# Patient Record
Sex: MALE | Race: WHITE
[De-identification: names, ages, dates, MRNs, and addresses within clinical notes are randomized per-mention and may not be internally consistent; named-entity substitution may affect disease eponyms.]

---

## 2017-02-05 ENCOUNTER — HOSPITAL ENCOUNTER (INPATIENT)
Dept: HOSPITAL 80 - FED | Age: 77
LOS: 4 days | Discharge: HOME HEALTH | DRG: 470 | End: 2017-02-09
Attending: INTERNAL MEDICINE | Admitting: INTERNAL MEDICINE
Payer: COMMERCIAL

## 2017-02-05 DIAGNOSIS — S72.011A: Primary | ICD-10-CM

## 2017-02-05 DIAGNOSIS — R26.9: ICD-10-CM

## 2017-02-05 DIAGNOSIS — D64.9: ICD-10-CM

## 2017-02-05 DIAGNOSIS — Z99.81: ICD-10-CM

## 2017-02-05 DIAGNOSIS — Z72.89: ICD-10-CM
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
## 2017-02-05 DIAGNOSIS — J44.9: ICD-10-CM
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
## 2017-02-05 DIAGNOSIS — M10.9: ICD-10-CM

## 2017-02-05 DIAGNOSIS — J96.10: ICD-10-CM

## 2017-02-05 DIAGNOSIS — N40.0: ICD-10-CM

## 2017-02-05 LAB
INR PPP: 2.49 (ref 0.83–1.16)
PROTHROMBIN TIME: 27.2 SEC (ref 12–15)

## 2017-02-05 NOTE — CPEKG
Heart Rate: 53

RR Interval: 1132

P-R Interval: 208

QRSD Interval: 104

QT Interval: 456

QTC Interval: 429

P Axis: 74

QRS Axis: 30

T Wave Axis: 50

EKG Severity - NORMAL ECG -

EKG Impression: SINUS RHYTHM

Electronically Signed By: Mauri Brice 06-Feb-2017 10:53:20

## 2017-02-05 NOTE — EDPHY
H & P


Stated Complaint: right leg gave out on pt. R hip pain with movement


HPI/ROS: 


HPI





CHIEF COMPLAINT:  Right hip pain status post mechanical trip and fall





HISTORY OF PRESENT ILLNESS:  This patient very pleasant 76-year-old male, he 

presents emergency room by EMS [facility] 15, alert and orient x4 after he sustained a 

mechanical trip and fall landing on his right hip.  He now has a shortened 

externally rotated right leg.  He is neurovascularly intact. He tells me that 

he fell asleep after the football game this evening he had multiple alcoholic 

beverages tonight.  His wife went to wake him up in get him up stood up out of 

his chair fell over no head strike denies neck pain chest pain or shortness of 

breath. denies being on blood thinners.  Denies any other areas of pain except 

right lateral hip pain.





Past Medical History:  Gout





Past Surgical History:  Multiple orthopedic surgeries including arms and fingers





Social History:  occasional alcohol use, denies drugs or tobacco products





Family History:


 noncontributory





ROS   


REVIEW OF SYSTEMS:


A comprehensive 10 point review of systems is otherwise negative aside from 

elements mentioned in the history of present illness.








Exam   


Constitutional   triage nursing summary reviewed, vital signs reviewed, awake/

alert. 


Eyes   normal conjunctivae and sclera, EOMI, PERRLA. 


HENT   normal inspection, atraumatic, moist mucus membranes, no epistaxis, neck 

supple/ no meningismus, no raccoon eyes. 


Respiratory   clear to auscultation bilaterally, normal breath sounds, no 

respiratory distress, no wheezing. 


Cardiovascular   rate normal, regular rhythm, no murmur, no edema, distal 

pulses normal. 


Gastrointestinal   soft, non-tender, no rebound, no guarding, normal bowel 

sounds, no distension, no pulsatile mass. 


Genitourinary   no CVA tenderness. 


Musculoskeletal  right hip:  Tender palpation over the lateral hip, right lower 

extremity is externally rotated and shortened.  Good sensation.  

Neurovascularly intact. Good distal pulses. Does have pain with range of 

motion. no midline vertebral tenderness, full range of motion, no calf swelling

, no tenderness of extremities, no meningismus, good pulses, neurovascularly 

intact.


Skin   pink, warm, & dry, no rash, skin atraumatic. 


Neurologic   awake, alert and oriented x 3, AAOx3, moves all 4 extremities 

equally, motor intact, sensory intact, CN II-XII intact, normal cerebellar, 

normal vision, normal speech. 


Psychiatric   normal mood/affect. 


Heme/Lymph/Immune   no lymphadenopathy.





Differential Diagnosis:  Includes but is not limited to in a particular order, 

right hip fracture, femur fracture, soft tissue injury, mechanical trip and fall





Medical Decision Making: This patient had a hip x-ray, EKG, blood work, an IV 

will be established be placed on a monitor.





Re-evaluation:








ED x-ray right hip:  This shows a right femoral neck hip fracture.





EKG interpretation by me on record in [facility] system.  Impression 





1230:  I did consult Orthopedics and spoke with Dr. Castrejon.  He will consult on 

this patient in the morning.





1230:  Is no this patient's INR is 2.4.  It is noted the hemoglobin is low at 

7. Will repeat the blood work as he is not on any anticoagulation.  And there 

may be a lab error.


  I will admit the patient to the hospitalist service for a right femoral neck 

fracture.








EKG interpretation by me on record in [facility] system.  Impression  time of 

EKG is 2304 this is sinus rhythm rate of 53 otherwise unremarkable no acute 

ischemic changes nose specifically no ST elevation, ST depression, T-wave 

abnormalities prolonged intervals.





1236:  Blood work is being repeated at this time I spoke with the hospitalist 

service Dr. Quigley agrees to admit this patient.  Orthopedics Dr. Castrejon is 

consult and aware.


Source: Patient





- Personal History


Current Tetanus/Diphtheria Vaccine: Unsure


Current Tetanus Diphtheria and Acellular Pertussis (TDAP): Unsure


Tetanus Vaccine Date: <10 years





- Medical/Surgical History


Hx Asthma: No


Hx Chronic Respiratory Disease: Yes


Hx Diabetes: No


Hx Cardiac Disease: No


Hx Renal Disease: No


Hx Cirrhosis: No


Hx Alcoholism: No


Hx HIV/AIDS: No


Hx Splenectomy or Spleen Trauma: No


Other PMH: gout, COPD





- Social History


Smoking Status: Former smoker


Constitutional: 


 Initial Vital Signs











Temperature (C)  37.1 C   02/05/17 22:40


 


Heart Rate  61   02/05/17 22:40


 


Respiratory Rate  18   02/05/17 22:40


 


Blood Pressure  124/54 H  02/05/17 22:40


 


O2 Sat (%)  92   02/05/17 22:40








 











O2 Delivery Mode               Nasal Cannula


 


O2 (L/minute)                  4














Allergies/Adverse Reactions: 


 





levofloxacin [From Levaquin] Allergy (Verified 02/05/17 22:45)


 


Sulfa (Sulfonamide Antibiotics) Allergy (Verified 08/18/11 17:02)


 THROAT CLOSING








Home Medications: 














 Medication  Instructions  Recorded


 


Allopurinol  02/05/17














Medical Decision Making





- Data Points


Laboratory Results: 


 











  02/06/17 02/05/17





  00:25 23:35


 


WBC  Pending   6.64 10^3/uL





    (3.80-9.50) 


 


RBC  Pending   2.09 L 10^6/uL





    (4.40-6.38) 


 


Hgb  Pending   7.0 L g/dL





    (13.7-17.5) 


 


Hct  Pending   21.2 L %





    (40.0-51.0) 


 


MCV  Pending   101.4 H fL





    (81.5-99.8) 


 


MCH  Pending   33.5 pg





    (27.9-34.1) 


 


MCHC  Pending   33.0 g/dL





    (32.4-36.7) 


 


RDW  Pending   12.7 %





    (11.5-15.2) 


 


Plt Count  Pending   94 L 10^3/uL





    (150-400) 


 


MPV  Pending   9.2 fL





    (8.7-11.7) 


 


Neut % (Auto)  Pending   75.6 H %





    (39.3-74.2) 


 


Lymph % (Auto)  Pending   15.4 %





    (15.0-45.0) 


 


Mono % (Auto)  Pending   7.1 %





    (4.5-13.0) 


 


Eos % (Auto)  Pending   1.2 %





    (0.6-7.6) 


 


Baso % (Auto)  Pending   0.2 L %





    (0.3-1.7) 


 


Nucleat RBC Rel Count  Pending   0.0 %





    (0.0-0.2) 


 


Absolute Neuts (auto)  Pending   5.03 10^3/uL





    (1.70-6.50) 


 


Absolute Lymphs (auto)  Pending   1.02 10^3/uL





    (1.00-3.00) 


 


Absolute Monos (auto)  Pending   0.47 10^3/uL





    (0.30-0.80) 


 


Absolute Eos (auto)  Pending   0.08 10^3/uL





    (0.03-0.40) 


 


Absolute Basos (auto)  Pending   0.01 L 10^3/uL





    (0.02-0.10) 


 


Absolute Nucleated RBC  Pending   0.00 10^3/uL





    (0-0.01) 


 


Immature Gran %  Pending   0.5 %





    (0.0-1.1) 


 


Immature Gran #  Pending   0.03 10^3/uL





    (0.00-0.10) 


 


PT  Pending   27.2 H SEC





    (12.0-15.0) 


 


INR  Pending   2.49 H 





    (0.83-1.16) 


 


APTT  Pending   45.9 H SEC





    (23.0-38.0) 


 


Sodium  Pending   Pending 





   


 


Potassium  Pending   Pending 





   


 


Chloride  Pending   Pending 





   


 


Carbon Dioxide  Pending   Pending 





   


 


Anion Gap  Pending   Pending 





   


 


BUN  Pending   Pending 





   


 


Creatinine  Pending   Pending 





   


 


Estimated GFR  Pending   Pending 





   


 


Glucose  Pending   Pending 





   


 


Calcium  Pending   Pending 





   











Medications Given: 


 








Discontinued Medications





Fentanyl (Sublimaze)  50 mcg IVP EDNOW ONE


   Stop: 02/05/17 22:53


   Last Admin: 02/05/17 23:16 Dose:  50 mcg


Sodium Chloride (Ns)  1,000 mls @ 0 mls/hr IV ONCE ONE


   PRN Reason: Wide Open


   Stop: 02/05/17 22:53


   Last Admin: 02/05/17 23:16 Dose:  1,000 mls


Ondansetron HCl (Zofran)  4 mg IVP EDNOW ONE


   Stop: 02/05/17 22:53


   Last Admin: 02/05/17 23:15 Dose:  4 mg








Departure





- Departure


Disposition: [facility] ([place], [place]) Inpatient Acute


Clinical Impression: 


 Fracture of femoral neck, right


Condition: Fair


Referrals: 


Jeramy Owens MD [Primary Care Provider] - As per Instructions

## 2017-02-06 LAB
% IMMATURE GRANULYOCYTES: (no result) % (ref 0–1.1)
% IMMATURE GRANULYOCYTES: 0.4 % (ref 0–1.1)
% IMMATURE GRANULYOCYTES: 0.4 % (ref 0–1.1)
ABSOLUTE IMMATURE GRANULOCYTES: (no result) 10^3/UL (ref 0–0.1)
ABSOLUTE IMMATURE GRANULOCYTES: 0.05 10^3/UL (ref 0–0.1)
ABSOLUTE IMMATURE GRANULOCYTES: 0.05 10^3/UL (ref 0–0.1)
ABSOLUTE NRBC COUNT: (no result) 10^3/UL (ref 0–0.01)
ABSOLUTE NRBC COUNT: 0 10^3/UL (ref 0–0.01)
ABSOLUTE NRBC COUNT: 0 10^3/UL (ref 0–0.01)
ADD DIFF?: (no result)
ADD DIFF?: NO
ADD DIFF?: NO
ADD MORPH?: (no result)
ADD MORPH?: NO
ADD MORPH?: NO
ADD SCAN?: (no result)
ADD SCAN?: NO
ADD SCAN?: NO
ANION GAP SERPL CALC-SCNC: (no result) MEQ/L (ref 8–16)
ANION GAP SERPL CALC-SCNC: 11 MEQ/L (ref 8–16)
ANION GAP SERPL CALC-SCNC: 13 MEQ/L (ref 8–16)
APTT BLD: 27.4 SEC (ref 23–38)
APTT BLD: 27.5 SEC (ref 23–38)
APTT BLD: 45.9 SEC (ref 23–38)
ATYPICAL LYMPHOCYTE FLAG: (no result) (ref 0–99)
ATYPICAL LYMPHOCYTE FLAG: 0 (ref 0–99)
ATYPICAL LYMPHOCYTE FLAG: 10 (ref 0–99)
CALCIUM SERPL-MCNC: (no result) MG/DL (ref 8.5–10.4)
CALCIUM SERPL-MCNC: 8.6 MG/DL (ref 8.5–10.4)
CALCIUM SERPL-MCNC: 8.6 MG/DL (ref 8.5–10.4)
CHLORIDE SERPL-SCNC: (no result) MEQ/L (ref 97–110)
CHLORIDE SERPL-SCNC: 109 MEQ/L (ref 97–110)
CHLORIDE SERPL-SCNC: 111 MEQ/L (ref 97–110)
CO2 SERPL-SCNC: (no result) MEQ/L (ref 22–31)
CO2 SERPL-SCNC: 24 MEQ/L (ref 22–31)
CO2 SERPL-SCNC: 25 MEQ/L (ref 22–31)
CREAT SERPL-MCNC: (no result) MG/DL (ref 0.7–1.3)
CREAT SERPL-MCNC: 0.8 MG/DL (ref 0.7–1.3)
CREAT SERPL-MCNC: 0.8 MG/DL (ref 0.7–1.3)
ERYTHROCYTE [DISTWIDTH] IN BLOOD BY AUTOMATED COUNT: (no result) % (ref 11.5–15.2)
ERYTHROCYTE [DISTWIDTH] IN BLOOD BY AUTOMATED COUNT: 12.6 % (ref 11.5–15.2)
ERYTHROCYTE [DISTWIDTH] IN BLOOD BY AUTOMATED COUNT: 12.6 % (ref 11.5–15.2)
FRAGMENT RBC FLAG: (no result) (ref 0–99)
FRAGMENT RBC FLAG: 0 (ref 0–99)
FRAGMENT RBC FLAG: 0 (ref 0–99)
GFR SERPL CREATININE-BSD FRML MDRD: (no result) ML/MIN/{1.73_M2}
GFR SERPL CREATININE-BSD FRML MDRD: > 60 ML/MIN/{1.73_M2}
GFR SERPL CREATININE-BSD FRML MDRD: > 60 ML/MIN/{1.73_M2}
GLUCOSE SERPL-MCNC: (no result) MG/DL (ref 70–100)
GLUCOSE SERPL-MCNC: 108 MG/DL (ref 70–100)
GLUCOSE SERPL-MCNC: 96 MG/DL (ref 70–100)
HCT VFR BLD CALC: (no result) % (ref 40–51)
HCT VFR BLD CALC: 40.1 % (ref 40–51)
HCT VFR BLD CALC: 40.3 % (ref 40–51)
HGB BLD-MCNC: (no result) G/DL (ref 13.7–17.5)
HGB BLD-MCNC: 13.8 G/DL (ref 13.7–17.5)
HGB BLD-MCNC: 13.9 G/DL (ref 13.7–17.5)
INR PPP: 1.17 (ref 0.83–1.16)
INR PPP: 1.18 (ref 0.83–1.16)
LEFT SHIFT FLG: (no result) (ref 0–99)
LEFT SHIFT FLG: 0 (ref 0–99)
LEFT SHIFT FLG: 20 (ref 0–99)
LIPEMIA HEMOLYSIS FLAG: (no result) (ref 0–99)
LIPEMIA HEMOLYSIS FLAG: 90 (ref 0–99)
LIPEMIA HEMOLYSIS FLAG: 90 (ref 0–99)
MAGNESIUM SERPL-MCNC: 1.6 MG/DL (ref 1.6–2.3)
MCH RBC BLDCO QN: (no result) PG (ref 27.9–34.1)
MCH RBC BLDCO QN: 34 PG (ref 27.9–34.1)
MCH RBC BLDCO QN: 34.2 PG (ref 27.9–34.1)
MCHC RBC AUTO-ENTMCNC: (no result) G/DL (ref 32.4–36.7)
MCHC RBC AUTO-ENTMCNC: 34.4 G/DL (ref 32.4–36.7)
MCHC RBC AUTO-ENTMCNC: 34.5 G/DL (ref 32.4–36.7)
MCV RBC AUTO: (no result) FL (ref 81.5–99.8)
MCV RBC AUTO: 98.8 FL (ref 81.5–99.8)
MCV RBC AUTO: 99.3 FL (ref 81.5–99.8)
NRBC-AUTO%: (no result) % (ref 0–0.2)
NRBC-AUTO%: 0 % (ref 0–0.2)
NRBC-AUTO%: 0 % (ref 0–0.2)
PLATELET # BLD: (no result) 10^3/UL (ref 150–400)
PLATELET # BLD: 188 10^3/UL (ref 150–400)
PLATELET # BLD: 195 10^3/UL (ref 150–400)
PLATELET CLUMPS FLAG: (no result) (ref 0–99)
PLATELET CLUMPS FLAG: 0 (ref 0–99)
PLATELET CLUMPS FLAG: 0 (ref 0–99)
PMV BLD AUTO: (no result) FL (ref 8.7–11.7)
PMV BLD AUTO: 9.6 FL (ref 8.7–11.7)
PMV BLD AUTO: 9.8 FL (ref 8.7–11.7)
POTASSIUM SERPL-SCNC: (no result) MEQ/L (ref 3.5–5.2)
POTASSIUM SERPL-SCNC: 4.3 MEQ/L (ref 3.5–5.2)
POTASSIUM SERPL-SCNC: 4.3 MEQ/L (ref 3.5–5.2)
POTASSIUM SERPL-SCNC: 4.4 MEQ/L (ref 3.5–5.2)
PROTHROMBIN TIME: 14.9 SEC (ref 12–15)
PROTHROMBIN TIME: 15 SEC (ref 12–15)
RBC # BLD AUTO: (no result) 10^6/UL (ref 4.4–6.38)
RBC # BLD AUTO: 4.06 10^6/UL (ref 4.4–6.38)
RBC # BLD AUTO: 4.06 10^6/UL (ref 4.4–6.38)
SODIUM SERPL-SCNC: (no result) MEQ/L (ref 134–144)
SODIUM SERPL-SCNC: 146 MEQ/L (ref 134–144)
SODIUM SERPL-SCNC: 147 MEQ/L (ref 134–144)
SPECIMEN HEMOLYSIS: (no result)
SPECIMEN TURBIDITY: (no result)

## 2017-02-06 PROCEDURE — 0SRR03Z REPLACEMENT OF RIGHT HIP JOINT, FEMORAL SURFACE WITH CERAMIC SYNTHETIC SUBSTITUTE, OPEN APPROACH: ICD-10-PCS | Performed by: ORTHOPAEDIC SURGERY

## 2017-02-06 RX ADMIN — ACETAMINOPHEN PRN MG: 500 TABLET ORAL at 19:39

## 2017-02-06 RX ADMIN — HYDROMORPHONE HYDROCHLORIDE PRN MG: 1 INJECTION, SOLUTION INTRAMUSCULAR; INTRAVENOUS; SUBCUTANEOUS at 03:20

## 2017-02-06 RX ADMIN — OXYCODONE HYDROCHLORIDE PRN MG: 15 TABLET ORAL at 19:39

## 2017-02-06 RX ADMIN — HYDROMORPHONE HYDROCHLORIDE PRN MG: 1 INJECTION, SOLUTION INTRAMUSCULAR; INTRAVENOUS; SUBCUTANEOUS at 11:50

## 2017-02-06 RX ADMIN — ACETAMINOPHEN PRN MG: 500 TABLET ORAL at 15:35

## 2017-02-06 RX ADMIN — SODIUM CHLORIDE SCH MLS: 900 INJECTION, SOLUTION INTRAVENOUS at 04:16

## 2017-02-06 RX ADMIN — OXYCODONE HYDROCHLORIDE PRN MG: 15 TABLET ORAL at 15:34

## 2017-02-06 NOTE — DX
Right Hip, 2 views



History: Follow-up right hip fracture



Comparison: Yesterday



Findings: There is a transverse subcapital hip fracture with mild overlapping of the medial left femo
ral neck. 



Impression: A right subcapital hip fracture is confirmed. If further confirmation is important, then 
consider CT or MRI.

## 2017-02-06 NOTE — ECHO
[id number]

[id number]



+---------------------------------------------------+      [address]

:                                                   :       [place] [address]

:                                                   :           [phone]

+---------------------------------------------------+       Fax [phone]



                       Adult Echocardiographic Report



+----------------------------------------------------------------------------

-----+

:Name: ELISE DAVILA PStudy Date: 2017 09:59 AM                        

     :

:MRN: [id number]      Hospital Admission Number: [id number] Location

: 381:

:: 1940      Gender: Male                           Height: 76 in   

     :

:Age: 76 yrs          Race: WH                               Weight: 164 lb  

     :

:Reason For Study: Eval LV Fx                                                

     :

:                                                            BSA: 2.0 meters2

     :

:History: Fall, Near Syncope, L hip FX                                       

     :

+----------------------------------------------------------------------------

-----+

MMode/2D Measurements & Calculations

IVSd: 0.96 cm   LVIDd: 4.8 cm  FS: 36.7 %            Ao root diam: 3.5 cm

LVPWd: 0.94 cm  LVIDs: 3.0 cm  EDV(Teich): 105.0 ml  ACS: 1.7 cm

                               ESV(Teich): 35.3 ml

                               EF(Teich): 66.4 %



Normal Measurement Values:

+----------------------------------------------------------------------------

----------------------------------+

:LVIDd (3.5-5.7cm)    IVSd (0.6-1.1cm)     LVPWd (0.6-1.1cm)     Aortic Root 

(2.0-3.7cm)Left Atrium (1.5-4.0cm):

:LV Vol(d) (76-115ml) LV Vol(s) (29-48ml)  Ejec Fraction (50-65%)PV Mars (0.6-

1.2m/s)    TV Mars (0.4-1.0m/s)    :

:MV E Mars (0.8-1.0m/s)MV A Mars (0.3-1.0m/s)LVOT Mars (0.7-1.2m/s) Asc Ao Mars (

0.9-1.8m/s)                       :

+----------------------------------------------------------------------------

----------------------------------+

Doppler Measurements & Calculations

MV E max mars:       Ao V2 max:         LV V1 max:         PA V2 max:

55.3 cm/sec         119.4 cm/sec       125.4 cm/sec       73.3 cm/sec

MV A max mars:       Ao max P.7 mmHgLV V1 max PG:      PA max P.1 cm/sec                            6.3 mmHg           2.1 mmHg

MV E/A: 0.82



Left Ventricle

The left ventricle is normal in size. There is normal left ventricular wall

thickness. The left ventricular ejection fraction is normal. There is

Doppler evidence for diastolic dysfunction. Ejection Fraction = 67%. The

left ventricular wall motion is normal.





Right Ventricle

The right ventricle is normal in size and function.





Atria

The left atrial size is normal. Right atrial size is normal.



Mitral Valve

The mitral valve is normal in structure and function. There is no evidence

of mitral valve prolapse. There is no mitral valve stenosis. There is trace

mitral regurgitation.



Tricuspid Valve

There is trace tricuspid regurgitation.



Aortic Valve

The aortic valve is normal in structure and function. The aortic valve is

trileaflet. There is no aortic stenosis. Trace aortic regurgitation.



Pulmonic Valve

The pulmonic valve is normal in structure and function. There is no pulmonic

valvular regurgitation.





Great Vessels

The aortic root is normal size.



Pericardium/Pleural

There is no pericardial effusion.



Conclusion

A complete two-dimensional transthoracic echocardiogram was performed (2D,

M-mode, Doppler and color flow Doppler).

The left ventricular ejection fraction is normal.

There is Doppler evidence for diastolic dysfunction.

Ejection Fraction = 67%.

The left ventricular wall motion is normal.

The right ventricle is normal in size and function.

The mitral valve is normal in structure and function.

There is trace mitral regurgitation.

There is trace tricuspid regurgitation.

The aortic valve is normal in structure and function.

The aortic valve is trileaflet.

Trace aortic regurgitation.

The pulmonic valve is normal in structure and function.

There is no pericardial effusion.





_____________________________________________________________________________





Final Reading Physician:

                        Nadeem Reed signed on 2017 03:36 PM

Ordering Physician: Suzan Quigley

Performed By: Wyatt Carter, CS

## 2017-02-06 NOTE — HOSPPROG
Hospitalist Progress Note


Assessment/Plan: 


Patient is a 76/M with COPD, chronic respiratory failure, BPH and gout who 

presents to the ED after a fall at home. Patient states he had fallen asleep on 

his recliner chair this evening while watching TV and after consuming about 2-3 

alcoholic beverages. At around 10pm, his wife woke him up to go to sleep in the 

bedroom. On trying to stand from the the chair he felt his R leg give way and 

he fell from a standing position to the ground, landing on his R side. I came 

by to update patient and his wife on plan of care.








# right nondisplaced right femoral neck fracture


* Dr Castrejon to take him to OR today





#. COPD/emphysema


* stable





#. alcohol use


* patient has a few drinks nightly/ has never had any symptoms of withdrawal/no 

seizures





#. gait instability resulting in the above


* PT and OT to see patient





#.BPH: Dr Singletary in OP setting





#. Gout: allopurinol





#. DVT prophylaxis: per ortho





#. Plan: surgery today/recheck labs in a.m.


Subjective: Fermín said his pain is well controlled.


Objective: 


 Vital Signs











Temp Pulse Resp BP Pulse Ox


 


 36.7 C   60   24 H  129/68 H  95 


 


 02/06/17 11:14  02/06/17 11:14  02/06/17 11:14  02/06/17 11:14  02/06/17 11:14








 Laboratory Results





 02/06/17 05:01 





 02/06/17 05:01 





 











 02/05/17 02/06/17 02/07/17





 05:59 05:59 05:59


 


Intake Total  1225 0


 


Output Total   250


 


Balance  1225 -250








 











PT  14.9 SEC (12.0-15.0)   02/06/17  05:01    


 


INR  1.17  (0.83-1.16)  H  02/06/17  05:01    














- Physical Exam


Constitutional: uncomfortable


Eyes: PERRL


Ears, Nose, Mouth, Throat: hearing normal


Cardiovascular: regular rate and rhythym


Respiratory: no respiratory distress


Gastrointestinal: normoactive bowel sounds


Skin: warm


Musculoskeletal: other (right leg shortened)


Neurologic: AAOx3


Psychiatric: interacting appropriately, not anxious





ICD10 Worksheet


Patient Problems: 


 Problems











Problem Status Diagnosed


 


Fracture of femoral neck, right Acute

## 2017-02-06 NOTE — DX
Right hip 2 views at 2315 hour



History: Trauma, fall, pain.



Comparison: None.



Findings: Right femoral neck fracture with slight angulation.



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



No additional pelvic bone fracture noted.



Impression: Right femoral neck fracture.

## 2017-02-06 NOTE — PDGENHP
History and Physical





- Chief Complaint


fall 





- History of Present Illness


Patient is a 76/M with COPD, chronic respiratory failure, BPH and gout who 

presents to the ED after a fall at home. Patient states he had fallen asleep on 

his recliner chair this evening while watching TV and after consuming about 2-3 

alcoholic beverages. At around 10pm, his wife woke him up to go to sleep in the 

bedroom. On trying to stand from the the chair he felt his R leg give way and 

he fell from a standing position to the ground, landing on his R side. He 

denies any head trauma, LOC, reports remembering the whole event. Denies 

preceding dizziness, lightheadedness, chest pain, palpitations or shortness of 

breath. His wife came back into the room and found him on the ground, unable to 

get up due to acute R hip pain, so she called EMS.





On arrival to the ED, patient was afebrile and hemodynamically stable. X-ray of 

R hip revealed acute nondisplaced R femoral neck fracture. Labs were obtained 

and were initially hemolyzed/inaccurate due to lab error, but repeat labs were 

largely unremarkable. Orthopedics was contacted by the ED physician and patient 

was admitted to the hospitalist service for further management. 





History Information





- Allergies/Home Medication List


Allergies/Adverse Reactions: 








levofloxacin [From Levaquin] Allergy (Verified 02/05/17 22:45)


 


Sulfa (Sulfonamide Antibiotics) Allergy (Verified 08/18/11 17:02)


 THROAT CLOSING





Home Medications: 








Allopurinol  02/05/17 [Last Taken Unknown]





I have personally reviewed and updated: family history, medical history, social 

history, surgical history





- Past Medical History


Additional medical history: Gout.  BPH.  COPD.  Chronic respiratory failure on 

home O2





- Surgical History


Additional surgical history: hernia repair





- Family History


Additional family history: M: lung ca, copd





- Social History


Smoking Status: Former smoker (x about 20 years, quit > 30 years ago)


Alcohol Use: Other (2-3 vodka or wine drinks daily)


Drug Use: None


Additional social history: Patient is a retired . Lives with 

wife, is independent in ADLs.





Review of Systems


ROS: 10pt was reviewed & negative except for what was stated in HPI & below





Physical Exam











Temp Pulse Resp BP Pulse Ox


 


 37.0 C   65   12   124/65 H  93 


 


 02/06/17 04:08  02/06/17 04:08  02/06/17 04:08  02/06/17 04:08  02/06/17 04:08




















O2 (L/minute)                  6














Constitutional: no apparent distress, appears nourished, not in pain


Eyes: PERRL, anicteric sclera, EOMI


Ears, Nose, Mouth, Throat: moist mucous membranes, hearing normal, ears appear 

normal, no oral mucosal ulcers


Cardiovascular: regular rate and rhythym, no murmur, rub, or gallop, pulses 

symmetric bilaterally, No JVD, No edema


Peripheral Pulses: 2+: dorsalis-pedis (R), dorsalis-pedis (L)


Respiratory: no respiratory distress, no rales or rhonchi, clear to auscultation


Gastrointestinal: normoactive bowel sounds, soft, non-tender abdomen, no 

palpable masses, No guarding, No rebound


Genitourinary: no bladder fullness, no bladder tenderness


Skin: warm, normal color, no rashes or abrasions, no fluctuance, No mottled


Musculoskeletal: no joint effusions, pain with ROM (of R hip)


Neurologic: AAOx3, sensation intact bilaterally, CN II-XII Intact, No weakness, 

No numbness


Psychiatric: interacting appropriately, not anxious, not encephalopathic, 

thought process linear





Lab Data & Imaging Review





 02/06/17 00:25





 02/06/17 00:25











WBC  11.88 10^3/uL (3.80-9.50)  H  02/06/17  00:25    


 


RBC  4.06 10^6/uL (4.40-6.38)  L  02/06/17  00:25    


 


Hgb  13.9 g/dL (13.7-17.5)   02/06/17  00:25    


 


Hct  40.3 % (40.0-51.0)   02/06/17  00:25    


 


MCV  99.3 fL (81.5-99.8)   02/06/17  00:25    


 


MCH  34.2 pg (27.9-34.1)  H  02/06/17  00:25    


 


MCHC  34.5 g/dL (32.4-36.7)   02/06/17  00:25    


 


RDW  12.6 % (11.5-15.2)   02/06/17  00:25    


 


Plt Count  195 10^3/uL (150-400)   02/06/17  00:25    


 


MPV  9.6 fL (8.7-11.7)   02/06/17  00:25    


 


Neut % (Auto)  76.2 % (39.3-74.2)  H  02/06/17  00:25    


 


Lymph % (Auto)  15.7 % (15.0-45.0)   02/06/17  00:25    


 


Mono % (Auto)  6.5 % (4.5-13.0)   02/06/17  00:25    


 


Eos % (Auto)  0.8 % (0.6-7.6)   02/06/17  00:25    


 


Baso % (Auto)  0.4 % (0.3-1.7)   02/06/17  00:25    


 


Nucleat RBC Rel Count  0.0 % (0.0-0.2)   02/06/17  00:25    


 


Absolute Neuts (auto)  9.06 10^3/uL (1.70-6.50)  H  02/06/17  00:25    


 


Absolute Lymphs (auto)  1.86 10^3/uL (1.00-3.00)   02/06/17  00:25    


 


Absolute Monos (auto)  0.77 10^3/uL (0.30-0.80)   02/06/17  00:25    


 


Absolute Eos (auto)  0.09 10^3/uL (0.03-0.40)   02/06/17  00:25    


 


Absolute Basos (auto)  0.05 10^3/uL (0.02-0.10)   02/06/17  00:25    


 


Absolute Nucleated RBC  0.00 10^3/uL (0-0.01)   02/06/17  00:25    


 


Immature Gran %  0.4 % (0.0-1.1)   02/06/17  00:25    


 


Immature Gran #  0.05 10^3/uL (0.00-0.10)   02/06/17  00:25    


 


PT  15.0 SEC (12.0-15.0)  D 02/06/17  00:25    


 


INR  1.18  (0.83-1.16)  H  02/06/17  00:25    


 


APTT  27.4 SEC (23.0-38.0)   02/06/17  00:25    


 


Turbidity  TNP   02/05/17  23:35    


 


Sodium  147 mEq/L (134-144)  H  02/06/17  00:25    


 


Potassium  4.4 mEq/L (3.5-5.2)   02/06/17  00:25    


 


Chloride  109 mEq/L ()   02/06/17  00:25    


 


Carbon Dioxide  25 mEq/l (22-31)   02/06/17  00:25    


 


Anion Gap  13 mEq/L (8-16)   02/06/17  00:25    


 


BUN  14 mg/dL (7-23)   02/06/17  00:25    


 


Creatinine  0.8 mg/dL (0.7-1.3)   02/06/17  00:25    


 


Estimated GFR  > 60   02/06/17  00:25    


 


Glucose  108 mg/dL ()  H  02/06/17  00:25    


 


Calcium  8.6 mg/dL (8.5-10.4)   02/06/17  00:25    


 


Specimen Hemolysis  REJ   02/05/17  23:35    








Visualized and Interpreted imaging results: Yes


Interpretation: X-ray hip: R femoral neck fracture without displacement


Visualized and Interpreted EKG results: Yes


EKG Interpretation: Positive for: normal sinsus rhythm (no st/t wave 

abnormalities)





Assessment & Plan


Assessment: 


 Patient is a 76/M with COPD, chronic respiratory failure, BPH who presents to 

the ED after a fall that has resulted in an acute R femoral neck fracture. 


Plan: 


# acute femoral neck fracture


Confirmed on x-ray, orthopedics consult pending. Will manage pain, avoid weight 

bearing and f/u ortho consult regarding need for surgical repair.





# fall


Sounds to have been mechanical in nature vs orthostatic, as patient lost his 

balance while rising from a seated position. On exam, patient is neurologically 

intact (although limited by pain), denies any preceding symptoms, denies head 

trauma or LOC. Will continue IVF hydration, monitor on telemetry, check TTE and 

obtain PT/OT evaluation. 





# chronic respiratory failure, COPD


Resp status stable, will cont supplemental O2 via nc and give duonebs prn.





# BPH


Denies any urinary complaints. Cont home med.





# Gout


Denies any gouty symptoms at present. Cont home allopurinol.





# dispo: admit to inpatient service for likely > 2 MN stay, as patient has 

acute hip fracture that will likely require surgical repair





# gen:


NPO


DVT ppx: SCDs


Full code

## 2017-02-07 LAB
% IMMATURE GRANULYOCYTES: 0.5 % (ref 0–1.1)
ABSOLUTE IMMATURE GRANULOCYTES: 0.06 10^3/UL (ref 0–0.1)
ABSOLUTE NRBC COUNT: 0 10^3/UL (ref 0–0.01)
ADD DIFF?: NO
ADD MORPH?: NO
ADD SCAN?: NO
ALBUMIN SERPL-MCNC: 2.8 G/DL (ref 3.5–5)
ALP SERPL-CCNC: 46 IU/L (ref 38–126)
ALT SERPL-CCNC: 30 IU/L (ref 21–72)
ANION GAP SERPL CALC-SCNC: 6 MEQ/L (ref 8–16)
AST SERPL-CCNC: 29 IU/L (ref 17–59)
ATYPICAL LYMPHOCYTE FLAG: 0 (ref 0–99)
BILIRUB SERPL-MCNC: 0.9 MG/DL (ref 0.1–1.4)
CALCIUM SERPL-MCNC: 8.2 MG/DL (ref 8.5–10.4)
CHLORIDE SERPL-SCNC: 109 MEQ/L (ref 97–110)
CO2 SERPL-SCNC: 25 MEQ/L (ref 22–31)
CREAT SERPL-MCNC: 0.6 MG/DL (ref 0.7–1.3)
ERYTHROCYTE [DISTWIDTH] IN BLOOD BY AUTOMATED COUNT: 12.6 % (ref 11.5–15.2)
FRAGMENT RBC FLAG: 0 (ref 0–99)
GFR SERPL CREATININE-BSD FRML MDRD: > 60 ML/MIN/{1.73_M2}
GLUCOSE SERPL-MCNC: 135 MG/DL (ref 70–100)
HCT VFR BLD CALC: 35.7 % (ref 40–51)
HGB BLD-MCNC: 12.1 G/DL (ref 13.7–17.5)
LEFT SHIFT FLG: 30 (ref 0–99)
LIPEMIA HEMOLYSIS FLAG: 90 (ref 0–99)
MAGNESIUM SERPL-MCNC: 1.7 MG/DL (ref 1.6–2.3)
MCH RBC BLDCO QN: 34 PG (ref 27.9–34.1)
MCHC RBC AUTO-ENTMCNC: 33.9 G/DL (ref 32.4–36.7)
MCV RBC AUTO: 100.3 FL (ref 81.5–99.8)
NRBC-AUTO%: 0 % (ref 0–0.2)
PLATELET # BLD: 152 10^3/UL (ref 150–400)
PLATELET CLUMPS FLAG: 0 (ref 0–99)
PMV BLD AUTO: 9.8 FL (ref 8.7–11.7)
POTASSIUM SERPL-SCNC: 4.2 MEQ/L (ref 3.5–5.2)
POTASSIUM SERPL-SCNC: 4.4 MEQ/L (ref 3.5–5.2)
PROT SERPL-MCNC: 5.3 G/DL (ref 6.3–8.2)
RBC # BLD AUTO: 3.56 10^6/UL (ref 4.4–6.38)
SODIUM SERPL-SCNC: 140 MEQ/L (ref 134–144)

## 2017-02-07 RX ADMIN — ENOXAPARIN SODIUM SCH MG: 100 INJECTION SUBCUTANEOUS at 10:16

## 2017-02-07 RX ADMIN — OXYCODONE HYDROCHLORIDE PRN MG: 15 TABLET ORAL at 19:55

## 2017-02-07 RX ADMIN — ALLOPURINOL SCH MG: 300 TABLET ORAL at 08:03

## 2017-02-07 RX ADMIN — OXYCODONE HYDROCHLORIDE PRN MG: 15 TABLET ORAL at 08:04

## 2017-02-07 RX ADMIN — CEFAZOLIN SCH MLS: 1 INJECTION, POWDER, FOR SOLUTION INTRAMUSCULAR; INTRAVENOUS at 13:18

## 2017-02-07 RX ADMIN — SODIUM CHLORIDE SCH MLS: 900 INJECTION, SOLUTION INTRAVENOUS at 03:09

## 2017-02-07 RX ADMIN — OXYCODONE HYDROCHLORIDE PRN MG: 15 TABLET ORAL at 15:42

## 2017-02-07 RX ADMIN — POLYETHYLENE GLYCOL 3350 SCH GM: 17 POWDER, FOR SOLUTION ORAL at 10:16

## 2017-02-07 RX ADMIN — CEFAZOLIN SCH MLS: 1 INJECTION, POWDER, FOR SOLUTION INTRAMUSCULAR; INTRAVENOUS at 21:15

## 2017-02-07 NOTE — HOSPPROG
Hospitalist Progress Note


Assessment/Plan: 


Patient is a 76/M with COPD, chronic respiratory failure, BPH and gout who 

presents to the ED after a fall at home. Patient states he had fallen asleep on 

his recliner chair this evening while watching TV and after consuming about 2-3 

alcoholic beverages. At around 10pm, his wife woke him up to go to sleep in the 

bedroom. On trying to stand from the the chair he felt his R leg give way and 

he fell from a standing position to the ground, landing on his R side. 








#. right nondisplaced right femoral neck fracture


* POD #1 r hip arthroplasty


* patient doing well this morning/pain well managed


* appreciate Dr Castrejon





#. anemia


* expected blood loss


* stable





#. COPD/emphysema


* stable





#. alcohol use


* patient has a few drinks nightly/ has never had any symptoms of withdrawal/no 

seizures





#. gait instability resulting in the above


* PT and OT to see patient





#. BPH: Dr Singletary in OP setting





#. Gout: allopurinol





#. DVT prophylaxis: LMWH initiated





#. Plan: PT and OT to see patient, repeat labs in a.m., [facility]


Subjective: Fermín said his pain is well managed/ no complaints.


Objective: 


 Vital Signs











Temp Pulse Resp BP Pulse Ox


 


 37.0 C   64   16   113/65   95 


 


 02/07/17 07:22  02/07/17 07:22  02/07/17 07:22  02/07/17 07:22  02/07/17 07:22








 Laboratory Results





 02/07/17 04:20 





 02/07/17 04:20 





 











 02/06/17 02/07/17 02/08/17





 05:59 05:59 05:59


 


Intake Total 1225 2757 


 


Output Total  1350 


 


Balance 1225 1407 








 











PT  14.9 SEC (12.0-15.0)   02/06/17  05:01    


 


INR  1.17  (0.83-1.16)  H  02/06/17  05:01    














- Physical Exam


Constitutional: no apparent distress, appears nourished, not in pain


Eyes: PERRL


Ears, Nose, Mouth, Throat: hearing normal


Cardiovascular: regular rate and rhythym, no murmur, rub, or gallop


Respiratory: no respiratory distress


Gastrointestinal: normoactive bowel sounds


Skin: warm, other (right hip with dressing in place/ swelling)


Neurologic: AAOx3


Psychiatric: interacting appropriately, not anxious





ICD10 Worksheet


Patient Problems: 


 Problems











Problem Status Diagnosed


 


Chronic Disease Mgmt/Transitional Care Acute 


 


Fracture of femoral neck, right Acute

## 2017-02-07 NOTE — POSTOPPROG
Post Op Note


Date of Operation: 02/06/17


Surgeon: Jus Castrejon


Anesthesia: GET(General Endotracheal)


Pre-op Diagnosis: Right Femoral Neck fracture


Post-op Diagnosis: Same


Indication: Patient fell on ice and fractured hip


Procedure: Right hip hemiarthroplasty


Findings: Fractured Right femoral neck


Inf/Abcess present in the surg proc area at time of surgery?: No


Depth: Superfical  (Skin SQ)


EBL: 100-500


Complications: 


None


Specimen(s): 


Right femoral head

## 2017-02-07 NOTE — DX
Right Hip



Technique:  AP pelvis and frog-leg right hip.



Clinical Indications:  Postoperative right subcapital hip fracture



Findings: A right hip hemiarthroplasty has been performed and the implant is in excellent anatomic al
ignment. Skin staples overlie the right hip.



Impression: Excellent postoperative alignment.

## 2017-02-08 LAB
% IMMATURE GRANULYOCYTES: 0.7 % (ref 0–1.1)
ABSOLUTE IMMATURE GRANULOCYTES: 0.06 10^3/UL (ref 0–0.1)
ABSOLUTE NRBC COUNT: 0 10^3/UL (ref 0–0.01)
ADD DIFF?: NO
ADD MORPH?: NO
ADD SCAN?: NO
ANION GAP SERPL CALC-SCNC: 6 MEQ/L (ref 8–16)
ATYPICAL LYMPHOCYTE FLAG: 0 (ref 0–99)
CALCIUM SERPL-MCNC: 8 MG/DL (ref 8.5–10.4)
CHLORIDE SERPL-SCNC: 102 MEQ/L (ref 97–110)
CO2 SERPL-SCNC: 26 MEQ/L (ref 22–31)
CREAT SERPL-MCNC: 0.7 MG/DL (ref 0.7–1.3)
ERYTHROCYTE [DISTWIDTH] IN BLOOD BY AUTOMATED COUNT: 12.4 % (ref 11.5–15.2)
FRAGMENT RBC FLAG: 0 (ref 0–99)
GFR SERPL CREATININE-BSD FRML MDRD: > 60 ML/MIN/{1.73_M2}
GLUCOSE SERPL-MCNC: 101 MG/DL (ref 70–100)
HCT VFR BLD CALC: 29.9 % (ref 40–51)
HGB BLD-MCNC: 10.2 G/DL (ref 13.7–17.5)
LEFT SHIFT FLG: 30 (ref 0–99)
LIPEMIA HEMOLYSIS FLAG: 90 (ref 0–99)
MAGNESIUM SERPL-MCNC: 1.8 MG/DL (ref 1.6–2.3)
MCH RBC BLDCO QN: 33.6 PG (ref 27.9–34.1)
MCHC RBC AUTO-ENTMCNC: 34.1 G/DL (ref 32.4–36.7)
MCV RBC AUTO: 98.4 FL (ref 81.5–99.8)
NRBC-AUTO%: 0 % (ref 0–0.2)
PLATELET # BLD: 144 10^3/UL (ref 150–400)
PLATELET CLUMPS FLAG: 0 (ref 0–99)
PMV BLD AUTO: 10 FL (ref 8.7–11.7)
POTASSIUM SERPL-SCNC: 3.9 MEQ/L (ref 3.5–5.2)
POTASSIUM SERPL-SCNC: 4.4 MEQ/L (ref 3.5–5.2)
RBC # BLD AUTO: 3.04 10^6/UL (ref 4.4–6.38)
SODIUM SERPL-SCNC: 134 MEQ/L (ref 134–144)

## 2017-02-08 RX ADMIN — CEFAZOLIN SCH MLS: 1 INJECTION, POWDER, FOR SOLUTION INTRAMUSCULAR; INTRAVENOUS at 05:13

## 2017-02-08 RX ADMIN — OXYCODONE HYDROCHLORIDE PRN MG: 15 TABLET ORAL at 16:40

## 2017-02-08 RX ADMIN — POLYETHYLENE GLYCOL 3350 SCH: 17 POWDER, FOR SOLUTION ORAL at 08:40

## 2017-02-08 RX ADMIN — OXYCODONE HYDROCHLORIDE PRN MG: 15 TABLET ORAL at 12:30

## 2017-02-08 RX ADMIN — ENOXAPARIN SODIUM SCH MG: 100 INJECTION SUBCUTANEOUS at 08:39

## 2017-02-08 RX ADMIN — POLYETHYLENE GLYCOL 3350 SCH GM: 17 POWDER, FOR SOLUTION ORAL at 08:39

## 2017-02-08 RX ADMIN — OXYCODONE HYDROCHLORIDE PRN MG: 15 TABLET ORAL at 08:23

## 2017-02-08 RX ADMIN — ALLOPURINOL SCH MG: 300 TABLET ORAL at 08:23

## 2017-02-08 NOTE — HOSPPROG
Hospitalist Progress Note


Assessment/Plan: 


Patient is a 76/M with COPD, chronic respiratory failure, BPH and gout who 

presents to the ED after a fall at home. Patient states he had fallen asleep on 

his recliner chair this evening while watching TV and after consuming about 2-3 

alcoholic beverages. At around 10pm, his wife woke him up to go to sleep in the 

bedroom. On trying to stand from the the chair he felt his R leg give way and 

he fell from a standing position to the ground, landing on his R side.  With my 

1st encounter with the patient, chart reviewed.  Discussed with case management.





#. right nondisplaced right femoral neck fracture


* POD #2 r hip arthroplasty


* patient doing well/pain well managed


* appreciate Dr Castrejon





#. anemia


* expected blood loss


* stable





#. COPD/emphysema


* stable





#. alcohol use


* patient has a few drinks nightly/ has never had any symptoms of withdrawal/no 

seizures





#. gait instability resulting in the above


* PT and OT to see patient





#. BPH: Dr Singletary in OP setting





#. Gout: allopurinol





#. DVT prophylaxis: LMWH initiated





#. Plan:  Anticipate discharge home with home health care in the next 1-2 days


Subjective: Up in the chair.  Feels well today.  Pain currently controlled.  

Anxious about ambulating independently.


Objective: 


 Vital Signs











Temp Pulse Resp BP Pulse Ox


 


 37.4 C   65   18   96/55 L  99 


 


 02/08/17 11:48  02/08/17 11:48  02/08/17 11:48  02/08/17 11:48  02/08/17 11:48








 Laboratory Results





 02/08/17 04:17 





 02/08/17 04:17 





 











 02/07/17 02/08/17 02/09/17





 05:59 05:59 05:59


 


Intake Total 2757 2100 


 


Output Total 1350 1200 


 


Balance 1407 900 








 











PT  14.9 SEC (12.0-15.0)   02/06/17  05:01    


 


INR  1.17  (0.83-1.16)  H  02/06/17  05:01    














- Physical Exam


Constitutional: no apparent distress, appears nourished, not in pain


Eyes: PERRL, anicteric sclera, EOMI


Ears, Nose, Mouth, Throat: moist mucous membranes, hearing normal, ears appear 

normal


Cardiovascular: regular rate and rhythym, No JVD, No edema


Respiratory: no respiratory distress, no rales or rhonchi, reduced air movement


Gastrointestinal: normoactive bowel sounds, No tenderness, No ascites


Skin: warm, normal color, No erythema


Musculoskeletal: muscular tenderness, abnormal gait, generalized weakness


Neurologic: AAOx3


Psychiatric: not anxious, not encephalopathic, thought process linear





ICD10 Worksheet


Patient Problems: 


 Problems











Problem Status Diagnosed


 


Chronic Disease Mgmt/Transitional Care Acute 


 


Fracture of femoral neck, right Acute

## 2017-02-08 NOTE — SOAPPROG
SOAP Progress Note


Assessment/Plan: 


Assessment





76m s/p Right hemiarthroplasty





Plan:


- Continue PT


- Weight bearing as tolerated


- Pain control


- DVT ppx


- Keep dressing intact/dry


- Dispo planning





02/08/17 06:20





Subjective: 


Pain controlled but c/o some incisional pain and weakness 2/2 surgical field. 

Got up with PT yesterday. 


Objective: 





 Vital Signs











Temp Pulse Resp BP Pulse Ox


 


 37.5 C   66   16   95/50 L  95 


 


 02/08/17 03:46  02/08/17 03:46  02/08/17 03:46  02/08/17 03:46  02/08/17 03:46








 Laboratory Results





 02/08/17 04:17 





 02/08/17 04:17 





 











 02/07/17 02/08/17 02/09/17





 05:59 05:59 05:59


 


Intake Total 2757 2100 


 


Output Total 1350 1200 


 


Balance 1407 900 








 











PT  14.9 SEC (12.0-15.0)   02/06/17  05:01    


 


INR  1.17  (0.83-1.16)  H  02/06/17  05:01    








SILT S/S/SP/DP/T, + EHL/FHL/TA/GS


CDI dressing





ICD10 Worksheet


Patient Problems: 


 Problems











Problem Status Diagnosed


 


Chronic Disease Mgmt/Transitional Care Acute 


 


Fracture of femoral neck, right Acute

## 2017-02-09 VITALS
OXYGEN SATURATION: 97 % | TEMPERATURE: 98.7 F | SYSTOLIC BLOOD PRESSURE: 115 MMHG | HEART RATE: 71 BPM | DIASTOLIC BLOOD PRESSURE: 61 MMHG

## 2017-02-09 VITALS — RESPIRATION RATE: 18 BRPM

## 2017-02-09 RX ADMIN — POLYETHYLENE GLYCOL 3350 SCH GM: 17 POWDER, FOR SOLUTION ORAL at 08:28

## 2017-02-09 RX ADMIN — OXYCODONE HYDROCHLORIDE PRN MG: 15 TABLET ORAL at 15:23

## 2017-02-09 RX ADMIN — ALLOPURINOL SCH MG: 300 TABLET ORAL at 08:28

## 2017-02-09 RX ADMIN — ENOXAPARIN SODIUM SCH MG: 100 INJECTION SUBCUTANEOUS at 08:29

## 2017-02-09 RX ADMIN — OXYCODONE HYDROCHLORIDE PRN MG: 15 TABLET ORAL at 09:56

## 2017-02-09 NOTE — PDIAF
- Diagnosis


Diagnosis: hip fx


Code Status: Full Code





- Medication Management


Discharge Medications: 


 Medications to Continue on Transfer





Allopurinol [Allopurinol 300 MG (RX)] 300 mg PO DAILY 02/05/17 [Last Taken 02/05 /17]


Acetaminophen [Tylenol  mg (*)] 500 - 1,000 mg PO Q6HRS PRN #0 tab 02/09/ 17 [Last Taken Unknown]


Aspirin [Aspirin 325 mg (*)] 325 mg PO DAILY #30 tab 02/09/17 [Last Taken 

Unknown]


Polyethylene Glycol 3350 [Miralax 17 gm (*)] 17 gm PO DAILY #0 pkt 02/09/17 [

Last Taken Unknown]


oxyCODONE IR [Oxycodone Ir (*)] 5 - 10 mg PO Q3HRS PRN #20 tab 02/09/17 [Last 

Taken Unknown]








Discharge Medications: Refer to the Discharge Home Medication list for PRN 

reason.


PICC Care - Routine: N/A





- Orders


Services needed: Home Care, Physical Therapy, Occupational Therapy


Home Care Face to Face: I certify that this patient was under my care and that 

I had the required face-to-face encounter meeting the encounter requirements on 

the discharge day.  My findings support the fact that the patient is homebound 

as defined in CMS Chapter 7 Medicare Benefits Manual 30.1.1, The condition of 

the patient is such that there exists a normal inability to leave home and 

consequently, leaving home would require a considerable and taxing effort.





- Follow Up Care


Current Providers and Referrals: 


Jus Castrejon MD [Medical Doctor] -  (follow up in 2 weeks after surgery (next 

Friday) call for appt)


Jeramy Owens MD [Primary Care Provider] - As per Instructions

## 2017-02-09 NOTE — GDS
[f rep st]



                                                             DISCHARGE SUMMARY





DISCHARGE DIAGNOSES:  

1.  Right nondisplaced femoral neck fracture.

2.  Anemia.

3.  History of chronic obstructive pulmonary disease.

4.  Gait instability.



CONSULTATIONS:  Dr. Castrejon of orthopedics.



STUDIES AND PROCEDURES DONE:  Right hip hemiarthroplasty.



PHYSICAL EXAM:  GENERAL:  The patient is alert.  VITAL SIGNS:  Afebrile at 37.1.  Pulse is 71.  Respi
ratory rate is 18.  Blood pressure is 115/61.  He is saturating 97% on 2 L.  I have seen and evaluate
d the patient on the day of discharge.



HOSPITAL COURSE:  The patient is a 76-year-old male who presented to the emergency room after sufferi
ng a mechanical fall.  He was evaluated and diagnosed with:

1.  Right nondisplaced femoral neck fracture.  During this hospitalization, he received a consultatio
n from Dr. Castrejon of orthopedics.  A right hemiarthroplasty was performed by Dr. Castrejon.  The patient is 
in the postoperative setting and doing well.  He will require home physical therapy and occupational 
therapy.

2.  Anemia.  This is stable and expected in the surgical setting.

3.  COPD.  This is a chronic problem for the patient.  He is chronically on supplemental oxygen and a
t his baseline currently at the time of disposition.

4.  Gait instability.  Will continue physical therapy and occupational therapy.

5.  Disposition.  The patient will be discharged home with his family as well as home health care.  



There are no pending studies.  Followup will be in the outpatient setting with his primary care physi
briana, Dr. Owens, as well as Dr. Castrejon, the orthopedist.



DISCHARGE MEDICATIONS:  Please refer to EMR form.  I have provided the patient a prescription for oxy
codone IR p.r.n., and he has been initiated on a full-strength aspirin at the time of disposition. 



I spent greater than 35 minutes in the care, coordination, and management of this patient's dispositi
on.





Job #:  [id number]

## 2017-02-09 NOTE — PDIAF
- Diagnosis


Diagnosis: hip fx


Code Status: Full Code





- Medication Management


Discharge Medications: 


 Medications to Continue on Transfer





Allopurinol [Allopurinol 300 MG (RX)] 300 mg PO DAILY 02/05/17 [Last Taken 02/05 /17]


Acetaminophen [Tylenol  mg (*)] 500 - 1,000 mg PO Q6HRS PRN #0 tab 02/09/ 17 [Last Taken Unknown]


Polyethylene Glycol 3350 [Miralax 17 gm (*)] 17 gm PO DAILY #0 pkt 02/09/17 [

Last Taken Unknown]


oxyCODONE IR [Oxycodone Ir (*)] 5 - 10 mg PO Q3HRS PRN #20 tab 02/09/17 [Last 

Taken Unknown]








Discharge Medications: Refer to the Discharge Home Medication list for PRN 

reason.


PICC Care - Routine: N/A





- Orders


Services needed: Home Care, Physical Therapy, Occupational Therapy


Home Care Face to Face: I certify that this patient was under my care and that 

I had the required face-to-face encounter meeting the encounter requirements on 

the discharge day.  My findings support the fact that the patient is homebound 

as defined in CMS Chapter 7 Medicare Benefits Manual 30.1.1, The condition of 

the patient is such that there exists a normal inability to leave home and 

consequently, leaving home would require a considerable and taxing effort.





- Follow Up Care


Current Providers and Referrals: 


Jeramy Owens MD [Primary Care Provider] - As per Instructions

## 2017-02-11 NOTE — SUROPNOTE
[facility] Operative Report





- Surgery


Orthopaedic Operative Note





Attending: Jus Castrejon





Preop Dx: Right Femoral Neck Fracture, displaced


Postop Dx: Same


Procedure: Right hemiarthroplasty





Anesthesia: General





Indications: 76y M s/p SLF p/w Right femoral neck fracture, displaced





Operative Report:


After review of consent with patient and family, they elected to proceed with 

the procedure.  The patient was taken to the OR.  Anesthesia was induced and he 

was transferred to the bed. Garcia was placed. 


He was positioned on the peg board in a lateral position. Bony prominences were 

padded, and axillary roll placed.  SCDs placed on contralateral leg.  The 

operative leg was prepped and draped in the typical fashion.





A timeout was performed, confirming the patient, surgical procedure and site.





Landmarks were marked out and leg lengths checked.  A standard posterolateral 

incision was made overlying the greater trochanter, approximately 2/3 distal 

and 1/3 proximal to the superior border of the trochanter.


Bovie was used for bleeders in the skin.  The dissection was carried down to 

the IT band, which was then sharply incised longitudinally with proximal 

extension through the gluteus arsenio fascia.  The fibers of the underlying 

arsenio were bluntly split.  


A Charnley retractor was introduced after palpating for the sciatic nerve and 

ensuring the Charnley blades were not contacting it. 


The bursa overlying the greater trochanter and capsule were gently split and 

pulled back.  The piriformis was identified at its insertion into the 

trochanter.  It was released from the bone with a bovie and tagged with 

ethibond suture.  The capsule over the neck was exposed by retracting gluteus 

minimus, and the bovie was used to perform a capsulotomy and to release the 

external rotators from the trochanter at once without extending past the 

quadratus distally nor cutting into the labrum proximally.





The fracture site was identified and loose fragments of bone were removed from 

the fracture site.  A sweetheart clamp was used to remove the femoral head from 

the socket, with additional benefit from having a large rounded edge retractor 

on hand and with releasing the ligamentum teres from the head.  The head 

diameter was measured and the anticipated head center to lesser neck distance 

calculated


Loose fragments were removed from the socket with irrigation.   A trial head 

was placed into the socket and adjusted for the best seal and fit until the 

final head size was selected.





 The double prong retractor was placed distal to the lesser trochanter.  A 

reciprocating saw was used to clean up the neck fracture site by cutting it off 

at ~15mm from the lesser.


A box ostetome was used to help clear the path to the channel. The channel 

finder was inserted. The lateralizing reamer was used. A series of enlarging 

broaches were used to prepare the femur for the trial broach with the best fit.

    


Once a snug fit was attained with the appropriate anteversion, a trial neck and 

head were placed on the best-fitting broach.  The head center to lesser 

trochanter length was measured and compared to preop calculations.  The hip was 

reduced and taken through a range of motion to test stability, including: hip 

flexion to 90 degree and thin internal rotation, adduction and internal rotation

, hyperextension, external rotation). Leg lengths were checked.  The components 

were adjusted until sufficient stability attained with approximately equivalent 

leg lengths (taking into account positioning).


Once satisfied with the trial component sizes, the trials were removed and the 

femur and acetabulum thoroughly irrigated.


The final components were placed and the leg lengths and stability maneuvers 

checked again.


Once satisfied, the entire incision and components were thoroughly irrigated 

again.  Two drill holes were made through the edge of the greater trochanter, 

spaced apart.  #2 Ethibond sutures were used to tag the proximal capsule and 

the external rotaters.  The ends of the sutures were passed through different 

holes.  The capsular suture tag ends were tied tightly first.  Next, the 

eternal rotators and piriformis tags were tied through the greater trochanter. #

1 vicryl was used to approximate the capsule closed over the neck.


#1 vicryl was used to close the IT band with interrupted stitches. A running 

stitch was used proximally in the gluteus fascia and then tied off.


Vicryl was used to close dead space in the fat and close the subcutaneous 

layer.  Staples were used to close the skin.


A sterile dressing (bacitracin, xeroform, gauze, ABD's, Medipore tape) was 

placed.  Drapes were taken down, the patient was placed into a leg abduction 

pillow, and the patient was laid supine and then transferred to the stretcher 

for further monitoring in PACU.





Counts were correct at the case conclusion


I was present for the entirety of the case





Implants: James Unitrax Endoprosthesis head 54mm OD; Unitrax C-taper neck 

sleeve +5mm offset; Secur-Fit Max 132 standard offset Hip stem Size #10, C-Tpr, 

35mm neck length, 160mm stem.


Complications: None


EBL: 300cc


Drains: none

## 2017-02-12 ENCOUNTER — HOSPITAL ENCOUNTER (EMERGENCY)
Dept: HOSPITAL 80 - FED | Age: 77
Discharge: HOME | End: 2017-02-12
Payer: COMMERCIAL

## 2017-02-12 VITALS
OXYGEN SATURATION: 92 % | HEART RATE: 68 BPM | SYSTOLIC BLOOD PRESSURE: 110 MMHG | TEMPERATURE: 97.9 F | RESPIRATION RATE: 16 BRPM | DIASTOLIC BLOOD PRESSURE: 73 MMHG

## 2017-02-12 DIAGNOSIS — Z79.82: ICD-10-CM

## 2017-02-12 DIAGNOSIS — Z98.890: ICD-10-CM

## 2017-02-12 DIAGNOSIS — J44.9: ICD-10-CM

## 2017-02-12 DIAGNOSIS — M79.89: Primary | ICD-10-CM

## 2017-02-12 DIAGNOSIS — Z87.891: ICD-10-CM

## 2017-02-12 NOTE — US
Ultrasound Venous Duplex/Doppler Right Leg



History:  Pain and swelling. *Leg pain, possible DVT*



Findings:  Ultrasound venous duplex and Doppler imaging of the common femoral vein, femoral vein, pop
liteal vein, calf veins, greater saphenous vein origin, and contralateral common femoral vein demonst
rates normal compressibility, color flow, and Doppler flow without deep venous thrombosis.



Impression:  No deep venous thrombosis right leg.







Findings and recommendations discussed with Emergency Department physician, Leonel Dickinson MD  at  1
7:45 hour, 2/12/2017.



Final report concurs with initial preliminary interpretation.

## 2017-02-12 NOTE — EDPHY
H & P


Stated Complaint: R leg swelling after hip surgery 2/6/17


Time Seen by Provider: 02/12/17 16:43





- Personal History


Current Tetanus/Diphtheria Vaccine: Yes


Current Tetanus Diphtheria and Acellular Pertussis (TDAP): Yes


Tetanus Vaccine Date: <10 years





- Medical/Surgical History


Hx Asthma: No


Hx Chronic Respiratory Disease: Yes


Hx Diabetes: No


Hx Cardiac Disease: No


Hx Renal Disease: No


Hx Cirrhosis: No


Hx Alcoholism: No


Hx HIV/AIDS: No


Hx Splenectomy or Spleen Trauma: No


Other PMH: gout, COPD, hernia operation, R hip replacement





- Social History


Smoking Status: Former smoker


Constitutional: 


 Initial Vital Signs











Temperature (C)  36.4 C   02/12/17 16:39


 


Heart Rate  74   02/12/17 16:39


 


Respiratory Rate  18   02/12/17 16:39


 


Blood Pressure  100/86 H  02/12/17 16:39


 


O2 Sat (%)  95   02/12/17 16:39








 











O2 Delivery Mode               Room Air














Allergies/Adverse Reactions: 


 





levofloxacin [From Levaquin] Allergy (Verified 02/12/17 16:39)


 


Sulfa (Sulfonamide Antibiotics) Allergy (Verified 02/12/17 16:39)


 THROAT CLOSING








Home Medications: 














 Medication  Instructions  Recorded


 


Allopurinol [Allopurinol 300  mg PO DAILY 02/05/17





(RX)]  


 


Acetaminophen [Tylenol  mg 500 - 1,000 mg PO Q6HRS PRN #0 tab 02/09/17





(*)]  


 


Aspirin [Aspirin 325 mg (*)] 325 mg PO DAILY #30 tab 02/09/17


 


Polyethylene Glycol 3350 [Miralax 17 gm PO DAILY #0 pkt 02/09/17





17 gm (*)]  


 


oxyCODONE IR [Oxycodone Ir (*)] 5 - 10 mg PO Q3HRS PRN #20 tab 02/09/17














Medical Decision Making


ED Course/Re-evaluation: 


CHIEF COMPLAINT:  Post-surgical right leg swelling





HISTORY OF PRESENT ILLNESS:  The patient is a 75 y/o male arriving with his 

family complaining of right hip and right leg swelling following a  to recent 

hip replacement secondary to a fall on 2/6/17, 6 days ago. He is not on post-

surgical anticoagulants, but is taking a daily aspirin. He denies chest pain or 

shortness of breath. No other symptoms.  





REVIEW OF SYSTEMS:  





A 10 point review of systems was performed and is negative with the exception 

of the elements mentioned in the history of present illness.





PHYSICAL EXAM:  





HR, BP, O2 Sat, RR.  Temp noted


General Appearance:  Alert, well hydrated, appropriate, and non-toxic appearing.


Head:  Atraumatic without scalp tenderness or obvious injury


Eyes:  Pupils equal, round, reactive to light and accommodation, EOMI, no trauma

, no injection.


Ears:  Clear bilaterally, no perforation, normal landmarks


Nose:  Atraumatic, no rhinorrhea, clear.


Throat:  There is no erythema or exudates, no lesions, normal tonsils, mucus 

membranes moist.


Neck:  Supple, 2+ carotid upstroke, nontender, no lymphadenopathy.


Respiratory:  No retractions, no distress, no wheezes, and no accessory muscle 

use.  Lungs are clear to auscultation bilaterally.


Cardiovascular:  Regular rate and rhythm, no murmurs, rubs, or gallops. 

Bilateral carotid, radial, dorsalis pedis, and posterior tibial pulses intact. 

Good capillary refill all extremities.


Gastrointestinal:  Abdomen is soft, nontender, non-distended, no masses, no 

rebound, no guarding, no peritoneal signs.


Musculoskeletal:  Normal active ROM of all extremities, atraumatic.


Neurological:  Alert, appropriate, and interactive.  The patient has normal 

DTRs and non-focal cranial nerves, motor, sensory, and cerebellar exam.


Skin:  No rashes, good turgor, no nodules on palpation.





Past medical history: Femur fracture 2/6/17 after fall.


Past surgical history: Right femur repair 2/6/17 by Dr. Castrejon


Family history: Noncontributory


Social history: Family at bedside





DIAGNOSTICS/PROCEDURES/CRITICAL CARE TIME:  





Study: Ultrasound of the: Right leg


   Indication: Swelling, recent surgery


Results: US scan of the leg was obtained.  The results of the study are 

negative.  The study was read by the radiologist, Dr. Breen.  I viewed the 

images myself on the PACS system.





DIFFERENTIAL DIAGNOSIS:   The differential diagnosis for the patient's leg 

swelling included but was not limited to hypoalbuminemia, congestive heart 

failure, cor pulmonale, venous stasis, trauma, and DVT.





MEDICAL DECISION MAKING:  





This is a 75 y/o male who had a right femoral head replacement 6 days ago 

following a fracture from a fall. He has had an uncomplicated recovery since 

then. He noticed increased swelling in his right leg when compared to left this 

morning and became concerned for a possible blood clot. He is not on 

prophylactic anticoagulants, but is taking an aspirin daily. They were unable 

to get in contact with his surgeon, so came to the ED for evaluation. On my exam

, his right leg appears normal. It is not tense, erythematous, edematous, or 

tender. He denies dyspnea or chest pain. Plan for ultrasound of his leg to rule 

out DVT.





1715: US is negative for DVT. I discussed this with the patient. He will be 

discharged home with follow up instructions and return precautions. He is 

comfortable with this plan. 











Departure





- Departure


Disposition: Home, Routine, Self-Care


Clinical Impression: 


 Right leg swelling, post surgical


Condition: Good


Instructions:  Leg Edema (ED)


Additional Instructions: 


Follow up with your orthopedic surgeon as planned. Return to the ED for severe 

pain, chest pain, shortness of breath, or other worsening of condition.


Referrals: 


Jeramy Owens MD [Primary Care Provider] - As per Instructions


Jus Castrejon MD [Medical Doctor] - As per Instructions


Report Scribed for: Leonel Dickinson


Report Scribed by: Ericka Rojas


Date of Report: 02/12/17


Time of Report: 17:06

## 2019-01-02 ENCOUNTER — HOSPITAL ENCOUNTER (OUTPATIENT)
Dept: HOSPITAL 80 - FIMAGING | Age: 79
Discharge: HOME | End: 2019-01-02
Attending: INTERNAL MEDICINE
Payer: COMMERCIAL

## 2019-01-02 DIAGNOSIS — J43.9: ICD-10-CM

## 2019-01-02 DIAGNOSIS — J40: Primary | ICD-10-CM
